# Patient Record
Sex: FEMALE | Race: WHITE | NOT HISPANIC OR LATINO
[De-identification: names, ages, dates, MRNs, and addresses within clinical notes are randomized per-mention and may not be internally consistent; named-entity substitution may affect disease eponyms.]

---

## 2018-05-02 ENCOUNTER — APPOINTMENT (OUTPATIENT)
Dept: OBGYN | Facility: CLINIC | Age: 83
End: 2018-05-02

## 2018-05-03 ENCOUNTER — APPOINTMENT (OUTPATIENT)
Dept: OBGYN | Facility: CLINIC | Age: 83
End: 2018-05-03
Payer: MEDICARE

## 2018-05-03 VITALS
WEIGHT: 170 LBS | SYSTOLIC BLOOD PRESSURE: 187 MMHG | HEIGHT: 62 IN | DIASTOLIC BLOOD PRESSURE: 79 MMHG | BODY MASS INDEX: 31.28 KG/M2

## 2018-05-03 DIAGNOSIS — M35.00 SICCA SYNDROME, UNSPECIFIED: ICD-10-CM

## 2018-05-03 DIAGNOSIS — Z80.3 FAMILY HISTORY OF MALIGNANT NEOPLASM OF BREAST: ICD-10-CM

## 2018-05-03 DIAGNOSIS — G62.9 POLYNEUROPATHY, UNSPECIFIED: ICD-10-CM

## 2018-05-03 DIAGNOSIS — Z86.018 PERSONAL HISTORY OF OTHER BENIGN NEOPLASM: ICD-10-CM

## 2018-05-03 DIAGNOSIS — E03.9 HYPOTHYROIDISM, UNSPECIFIED: ICD-10-CM

## 2018-05-03 DIAGNOSIS — Z87.891 PERSONAL HISTORY OF NICOTINE DEPENDENCE: ICD-10-CM

## 2018-05-03 DIAGNOSIS — Z80.8 FAMILY HISTORY OF MALIGNANT NEOPLASM OF OTHER ORGANS OR SYSTEMS: ICD-10-CM

## 2018-05-03 DIAGNOSIS — I10 ESSENTIAL (PRIMARY) HYPERTENSION: ICD-10-CM

## 2018-05-03 DIAGNOSIS — N90.89 OTHER SPECIFIED NONINFLAMMATORY DISORDERS OF VULVA AND PERINEUM: ICD-10-CM

## 2018-05-03 DIAGNOSIS — H20.9 UNSPECIFIED IRIDOCYCLITIS: ICD-10-CM

## 2018-05-03 DIAGNOSIS — O02.1 MISSED ABORTION: ICD-10-CM

## 2018-05-03 DIAGNOSIS — Z80.0 FAMILY HISTORY OF MALIGNANT NEOPLASM OF DIGESTIVE ORGANS: ICD-10-CM

## 2018-05-03 DIAGNOSIS — E11.9 TYPE 2 DIABETES MELLITUS W/OUT COMPLICATIONS: ICD-10-CM

## 2018-05-03 DIAGNOSIS — H20.829 VOGT-KOYANAGI SYNDROME, UNSPECIFIED EYE: ICD-10-CM

## 2018-05-03 DIAGNOSIS — Z98.891 HISTORY OF UTERINE SCAR FROM PREVIOUS SURGERY: ICD-10-CM

## 2018-05-03 DIAGNOSIS — G51.0 BELL'S PALSY: ICD-10-CM

## 2018-05-03 PROCEDURE — 99203 OFFICE O/P NEW LOW 30 MIN: CPT

## 2018-05-03 RX ORDER — AZITHROMYCIN 250 MG/1
250 TABLET, FILM COATED ORAL
Qty: 6 | Refills: 0 | Status: ACTIVE | COMMUNITY
Start: 2018-01-10

## 2018-05-03 RX ORDER — CYANOCOBALAMIN 500 UG/1
500 SPRAY NASAL
Qty: 4 | Refills: 0 | Status: ACTIVE | COMMUNITY
Start: 2018-04-05

## 2018-05-03 RX ORDER — PEN NEEDLE, DIABETIC 29 G X1/2"
31G X 8 MM NEEDLE, DISPOSABLE MISCELLANEOUS
Qty: 100 | Refills: 0 | Status: ACTIVE | COMMUNITY
Start: 2018-02-01

## 2018-05-03 RX ORDER — PREDNISOLONE ACETATE 10 MG/ML
1 SUSPENSION/ DROPS OPHTHALMIC
Qty: 5 | Refills: 0 | Status: ACTIVE | COMMUNITY
Start: 2018-04-06

## 2018-05-03 RX ORDER — CLOTRIMAZOLE AND BETAMETHASONE DIPROPIONATE 10; .5 MG/G; MG/G
1-0.05 CREAM TOPICAL TWICE DAILY
Qty: 30 | Refills: 0 | Status: ACTIVE | COMMUNITY
Start: 2018-05-03 | End: 1900-01-01

## 2018-05-03 RX ORDER — LEVOTHYROXINE SODIUM 0.03 MG/1
25 TABLET ORAL
Qty: 30 | Refills: 0 | Status: ACTIVE | COMMUNITY
Start: 2018-03-05

## 2018-05-03 RX ORDER — INSULIN GLARGINE 100 [IU]/ML
100 INJECTION, SOLUTION SUBCUTANEOUS
Qty: 15 | Refills: 0 | Status: ACTIVE | COMMUNITY
Start: 2018-04-04

## 2018-05-03 RX ORDER — CLINDAMYCIN HYDROCHLORIDE 150 MG/1
150 CAPSULE ORAL
Qty: 28 | Refills: 0 | Status: ACTIVE | COMMUNITY
Start: 2018-01-24

## 2018-05-03 RX ORDER — BLOOD SUGAR DIAGNOSTIC
STRIP MISCELLANEOUS
Qty: 200 | Refills: 0 | Status: ACTIVE | COMMUNITY
Start: 2017-07-31

## 2018-05-03 RX ORDER — LISINOPRIL 40 MG/1
40 TABLET ORAL
Qty: 90 | Refills: 0 | Status: ACTIVE | COMMUNITY
Start: 2018-01-13

## 2018-05-03 RX ORDER — ACYCLOVIR 400 MG/1
400 TABLET ORAL
Qty: 50 | Refills: 0 | Status: ACTIVE | COMMUNITY
Start: 2018-04-18

## 2018-05-03 RX ORDER — PREDNISONE 20 MG/1
20 TABLET ORAL
Qty: 8 | Refills: 0 | Status: ACTIVE | COMMUNITY
Start: 2018-04-15

## 2018-05-03 RX ORDER — TERCONAZOLE 80 MG/1
80 SUPPOSITORY VAGINAL
Qty: 3 | Refills: 0 | Status: ACTIVE | COMMUNITY
Start: 2018-05-03 | End: 1900-01-01

## 2018-05-03 RX ORDER — AMLODIPINE BESYLATE 2.5 MG/1
2.5 TABLET ORAL
Qty: 180 | Refills: 0 | Status: ACTIVE | COMMUNITY
Start: 2018-02-19

## 2018-05-03 RX ORDER — PREDNISONE 10 MG/1
10 TABLET ORAL
Qty: 10 | Refills: 0 | Status: ACTIVE | COMMUNITY
Start: 2018-04-18

## 2018-05-03 RX ORDER — LEVOTHYROXINE SODIUM 0.1 MG/1
100 TABLET ORAL
Qty: 30 | Refills: 0 | Status: ACTIVE | COMMUNITY
Start: 2018-04-06

## 2018-05-03 RX ORDER — METOPROLOL SUCCINATE 50 MG/1
50 TABLET, EXTENDED RELEASE ORAL
Qty: 180 | Refills: 0 | Status: ACTIVE | COMMUNITY
Start: 2018-02-10

## 2018-05-07 LAB
CANDIDA VAG CYTO: NOT DETECTED
G VAGINALIS+PREV SP MTYP VAG QL MICRO: NOT DETECTED
T VAGINALIS VAG QL WET PREP: NOT DETECTED

## 2018-05-08 ENCOUNTER — APPOINTMENT (OUTPATIENT)
Dept: OBGYN | Facility: CLINIC | Age: 83
End: 2018-05-08

## 2022-06-07 ENCOUNTER — APPOINTMENT (OUTPATIENT)
Dept: ORTHOPEDIC SURGERY | Facility: CLINIC | Age: 87
End: 2022-06-07
Payer: MEDICARE

## 2022-06-07 DIAGNOSIS — S83.281D OTHER TEAR OF LATERAL MENISCUS, CURRENT INJURY, RIGHT KNEE, SUBSEQUENT ENCOUNTER: ICD-10-CM

## 2022-06-07 PROCEDURE — 99213 OFFICE O/P EST LOW 20 MIN: CPT | Mod: 25

## 2022-06-07 PROCEDURE — 20610 DRAIN/INJ JOINT/BURSA W/O US: CPT

## 2022-06-07 NOTE — HISTORY OF PRESENT ILLNESS
[8] : 8 [Dull/Aching] : dull/aching [Localized] : localized [Nothing helps with pain getting better] : Nothing helps with pain getting better [Retired] : Work status: retired [de-identified] : Follow up today. Right knee pain continues. left knee several months ago had zilretta and it helpped a lot. here with her son today [] : Post Surgical Visit: no [FreeTextEntry1] : right knee [FreeTextEntry5] : pt is following up on her right knee  [de-identified] : motion  [de-identified] : cortisone

## 2022-06-07 NOTE — PHYSICAL EXAM
[Right] : right knee [Left] : left knee [NL (0)] : extension 0 degrees [5___] : hamstring 5[unfilled]/5 [] : no erythema [TWNoteComboBox7] : flexion 110 degrees

## 2022-06-07 NOTE — PROCEDURE
[FreeTextEntry3] : Large Joint Injection was performed in the right knee because of pain and inflammation. \par Zilretta: An injection of Zilretta 5 mg ,  \par Lidocaine: 4 cc\par \par Patient has tried OTC's including aspirin, Ibuprofen, Aleve etc or prescription NSAIDS, and/or exercises at home and/ or physical therapy without satisfactory response and Patient has decreased mobility in the joint. The risks, benefits, and alternatives to cortisone injection were explained in full to the patient. Risks outlined include but are not limited to infection, sepsis, bleeding, scarring, skin discoloration, temporary increase in pain, syncopal episode, failure to resolve symptoms, allergic reaction, symptom recurrence, and elevation of blood sugar in diabetics. Patient understood the risks. All questions were answered. After discussion of options, patient requested an injection. Oral informed consent was obtained. Sterile technique was utilized for the procedure including the preparation of the solutions used for the injection. Injection site was prepped with betadine and alcohol. Ethyl chloride sprayed topically. Sterile technique used. Patient tolerated procedure well. \par \par Post Procedure Instructions: Patient was advised to call if redness, pain, or fever occur. Apply ice for 15 min. every 2 hours for the next 12-24 hours as tolerated. Patient was advised to rest the joint(s) for 1-2 days.\par \par \par

## 2022-06-07 NOTE — DISCUSSION/SUMMARY
[de-identified] : The patient was advised of the diagnosis. The natural history of the pathology was explained in full to the patient in layman's terms. All questions were answered. The risks and benefits of surgical and non-surgical treatment alternatives were explained in full to the patient.\par \par The risks, benefits, and alternatives to corticosteroid injection were reviewed with the patient. Risks outlined include, but are not limited to infection, sepsis, bleeding, scarring, skin discoloration, temporary increase in pain, syncopal episode, failure to resolve symptoms, symptoms recurrence, allergic reaction, flare reaction, and elevation of blood sugar in diabetics. Patient understood the risks and asked to proceed with this treatment course.\par Knee would be helpped by total knee but age contraindication. cortisone when needed unlikely that visco would help. all explained

## 2022-08-23 ENCOUNTER — APPOINTMENT (OUTPATIENT)
Dept: ORTHOPEDIC SURGERY | Facility: CLINIC | Age: 87
End: 2022-08-23

## 2022-08-23 VITALS — HEIGHT: 62 IN | WEIGHT: 170 LBS | BODY MASS INDEX: 31.28 KG/M2

## 2022-08-23 PROCEDURE — 20610 DRAIN/INJ JOINT/BURSA W/O US: CPT

## 2022-08-23 PROCEDURE — 99213 OFFICE O/P EST LOW 20 MIN: CPT | Mod: 25

## 2022-08-23 NOTE — HISTORY OF PRESENT ILLNESS
[8] : 8 [Dull/Aching] : dull/aching [Localized] : localized [de-identified] : Follow up today. [FreeTextEntry1] : right knee

## 2022-08-23 NOTE — DISCUSSION/SUMMARY
[de-identified] : The patient and her son were advised of the diagnosis. The natural history of the pathology was explained in full to the patient in layman's terms. All questions were answered. The risks and benefits of surgical and non-surgical treatment alternatives were explained in full to the patient.\par \par The risks, benefits, and alternatives to viscosupplementation injection were reviewed with the patient. Risks outlined include but are not limited to infection, sepsis, bleeding, scarring, temporary increase in pain, syncopal episode, failure to resolve symptoms, symptoms recurrence, allergic reaction, flare reaction, pseudoseptic reaction. Patient understood the risks and asked to proceed with this treatment course.\par \par Total knee discussed and discussed risk and benefits. Gel one tried today and odds of it helping explained to patient and her son\par

## 2022-08-23 NOTE — REASON FOR VISIT
[Family Member] : family member [FreeTextEntry2] : Patient is here for a Follow Up appointment for the Right Knee.

## 2022-08-23 NOTE — PROCEDURE
[FreeTextEntry3] : Viscosupplementation Injection: X-ray evidence of osteoarthritis on this or prior visit and patient has tried OTC's including aspirin, Ibuprofen, Aleve etc or prescription NSAIDS, and/or exercises at home and/ or physical therapy without satisfactory response. \par \par An injection of Gel One #1 was injected into the right knee(s) after verbal consent obtained. \par \par Patient has tried OTC's including aspirin, Ibuprofen, Aleve etc or prescription NSAIDS, and/or exercises at home and/ or physical therapy without satisfactory response and Patient has decreased mobility in the joint. The risks, benefits, and alternatives to cortisone injection were explained in full to the patient. Risks outlined include but are not limited to infection, sepsis, bleeding, scarring, skin discoloration, temporary increase in pain, syncopal episode, failure to resolve symptoms, allergic reaction, and symptom recurrence. Patient understands the risks. All questions were answered. After discussion of options, patient requested an injection. Oral informed consent was obtained. Sterile technique was utilized for the procedure including the preparation of the solutions used for the injection. Injection site was prepped with betadine and alcohol. Ethyl chloride sprayed topically. Sterile technique used. Patient tolerated procedure well. \par \par Post Procedure Instructions: Patient was advised to call if redness, pain, or fever occur. Apply ice for 15 min. every 2 hours for the next 12-24 hours as tolerated. Patient was advised to rest the joint(s) for 1-2 days.\par

## 2022-11-08 ENCOUNTER — APPOINTMENT (OUTPATIENT)
Dept: ORTHOPEDIC SURGERY | Facility: CLINIC | Age: 87
End: 2022-11-08

## 2022-11-09 ENCOUNTER — APPOINTMENT (OUTPATIENT)
Dept: ORTHOPEDIC SURGERY | Facility: CLINIC | Age: 87
End: 2022-11-09

## 2022-11-09 VITALS — HEIGHT: 62 IN | WEIGHT: 170 LBS | BODY MASS INDEX: 31.28 KG/M2

## 2022-11-09 PROCEDURE — 99213 OFFICE O/P EST LOW 20 MIN: CPT

## 2022-11-09 NOTE — HISTORY OF PRESENT ILLNESS
[7] : 7 [de-identified] : Follow up today. She had good relief with Gel One injection at the last visit (8/3/22), but symptoms have started to return over the past 3 weeks. No new injury. Describes pain and swelling in both knees (R>L). She does get at home therapy and has been using rodolfo turner with some relief. She has had relief with Zilretta in the past (last was 6/7/22). [FreeTextEntry1] : right knee

## 2022-11-09 NOTE — DISCUSSION/SUMMARY
[de-identified] : The patient was advised of the diagnosis. The natural history of the pathology was explained in full to the patient in layman's terms. All questions were answered. The risks and benefits of surgical and non-surgical treatment alternatives were explained in full to the patient.\par \par Will ask cardiologist about topical diclofenac.\par \par No injection today due to likely UTI. Will plan for Zilretta injection next month pending resolution of UTI. She cannot have standard cortisone due to elevated BS.\par \par Rx for HKB.

## 2022-12-01 ENCOUNTER — APPOINTMENT (OUTPATIENT)
Dept: ORTHOPEDIC SURGERY | Facility: CLINIC | Age: 87
End: 2022-12-01

## 2022-12-01 VITALS — HEIGHT: 62 IN | WEIGHT: 170 LBS | BODY MASS INDEX: 31.28 KG/M2

## 2022-12-01 PROCEDURE — 99213 OFFICE O/P EST LOW 20 MIN: CPT | Mod: 25

## 2022-12-01 PROCEDURE — 20610 DRAIN/INJ JOINT/BURSA W/O US: CPT | Mod: RT

## 2022-12-01 NOTE — HISTORY OF PRESENT ILLNESS
[de-identified] : Follow up today. Symptoms continue and states right knee pain has been worse over the past few days. No new injury. Notes increased difficulty rising from a seated position. She is getting at home therapy. Did have relief with Zilretta in the past (last was 6/7/22).

## 2022-12-01 NOTE — DISCUSSION/SUMMARY
[de-identified] : The risks, benefits, and alternatives to corticosteroid injection were reviewed with the patient. Risks outlined include, but are not limited to infection, sepsis, bleeding, scarring, skin discoloration, temporary increase in pain, syncopal episode, failure to resolve symptoms, symptoms recurrence, allergic reaction, flare reaction, and elevation of blood sugar in diabetics. Patient understood the risks and asked to proceed with this treatment course.\par

## 2022-12-01 NOTE — PROCEDURE
[FreeTextEntry3] : Large Joint Injection was performed in the right knee because of pain and inflammation. \par Zilretta: An injection of Zilretta 5 mg ,  \par Lidocaine: 4 cc\par \par Patient has tried OTC's including aspirin, Ibuprofen, Aleve etc or prescription NSAIDS, and/or exercises at home and/ or physical therapy without satisfactory response and Patient has decreased mobility in the joint. The risks, benefits, and alternatives to cortisone injection were explained in full to the patient. Risks outlined include but are not limited to infection, sepsis, bleeding, scarring, skin discoloration, temporary increase in pain, syncopal episode, failure to resolve symptoms, allergic reaction, symptom recurrence, and elevation of blood sugar in diabetics. Patient understood the risks. All questions were answered. After discussion of options, patient requested an injection. Oral informed consent was obtained. Sterile technique was utilized for the procedure including the preparation of the solutions used for the injection. Injection site was prepped with betadine and alcohol. Ethyl chloride sprayed topically. Sterile technique used. Patient tolerated procedure well. \par \par Post Procedure Instructions: Patient was advised to call if redness, pain, or fever occur. Apply ice for 15 min. every 2 hours for the next 12-24 hours as tolerated. Patient was advised to rest the joint(s) for 1-2 days.

## 2023-05-30 ENCOUNTER — APPOINTMENT (OUTPATIENT)
Dept: ORTHOPEDIC SURGERY | Facility: CLINIC | Age: 88
End: 2023-05-30
Payer: MEDICARE

## 2023-05-30 VITALS — BODY MASS INDEX: 31.28 KG/M2 | WEIGHT: 170 LBS | HEIGHT: 62 IN

## 2023-05-30 VITALS — HEIGHT: 62 IN | BODY MASS INDEX: 31.28 KG/M2 | WEIGHT: 170 LBS

## 2023-05-30 PROCEDURE — 20610 DRAIN/INJ JOINT/BURSA W/O US: CPT | Mod: RT

## 2023-05-30 PROCEDURE — 99214 OFFICE O/P EST MOD 30 MIN: CPT | Mod: 25

## 2023-05-30 NOTE — IMAGING
[de-identified] : RIGHT KNEE EXAM\par AlignmentVarus \par Effusion: None\par Atrophy: None                                                 \par Stable to Varus/valgus stress\par Posterior Drawer Test: negative\par Anterior Drawer Test: Negative\par Knee Extension/Flexion: 0 / 120\par \par Medial/lateral compartments\par Medial joint line: No Tenderness\par Lateral joint line: No Tenderness\par Radha test: negative\par \par Patellofemoral joint\par Medial patellar facet: no tenderness\par Patellar grind: Negative\par \par Tendons:\par Pes Anserine: No tenderness\par Gerdy’s Tubercle/ IT Band: No tenderness\par Quadriceps Tendon: No Tenderness\par patellar tendon: no Tenderness\par Tibial tubercle: not tenderness\par Calf: no Tenderness\par \par Neurovascular exam\par Muscle strength: 5/5\par Sensation to light touch: intact\par Distal pulses: 2+\par \par IMAGING:\par 05/30/2023 Xrays of the Right Knee were taken demonstrating tricompartmental arthritis with joint space collapse, osteophytes, and sclerosis. primarily PF OA\par

## 2023-05-30 NOTE — PROCEDURE
[FreeTextEntry3] : The risks, benefits and alternatives to the injection were discussed with the patient. The decision was made to proceed with the injection to reduce inflammation within the area. Verbal consent was obtained for the procedure. The right knee was cleaned with alcohol and ethyl chloride was sprayed topically. 1cc of Zilretta was injected. The patient tolerated the procedure well and was instructed to ice the affected joint as needed later today. Activities can be performed as tolerated\par \par \par

## 2023-05-30 NOTE — ASSESSMENT
[FreeTextEntry1] : 86 y/o F with R knee OA\par \par Zilretta given\par Follow up in three months PRN\par

## 2023-05-30 NOTE — HISTORY OF PRESENT ILLNESS
[8] : 8 [Dull/Aching] : dull/aching [de-identified] : 05/30/2023 Ms. JOHN GEORGE is a 87 year female that comes in today with a chief complaint of Ji knee pain. Rt Knee pain worse than left. Has seen Dr. Brooks. Was undergoing zilretta injections. Last inj was in December. States injection is now wearing off. Gel injection in the past didn’t help out much [] : no [FreeTextEntry1] : b/l knee pain  [FreeTextEntry9] : CSI [de-identified] : Movement  [de-identified] : 12/1/22 [de-identified] : Dr. Brooks [de-identified] : CSI, gel injections

## 2023-09-05 ENCOUNTER — APPOINTMENT (OUTPATIENT)
Dept: ORTHOPEDIC SURGERY | Facility: CLINIC | Age: 88
End: 2023-09-05

## 2023-09-26 ENCOUNTER — APPOINTMENT (OUTPATIENT)
Dept: ORTHOPEDIC SURGERY | Facility: CLINIC | Age: 88
End: 2023-09-26
Payer: MEDICARE

## 2023-09-26 VITALS — BODY MASS INDEX: 31.28 KG/M2 | WEIGHT: 170 LBS | HEIGHT: 62 IN

## 2023-09-26 PROCEDURE — 73564 X-RAY EXAM KNEE 4 OR MORE: CPT | Mod: RT

## 2023-09-26 PROCEDURE — 20610 DRAIN/INJ JOINT/BURSA W/O US: CPT | Mod: RT

## 2023-09-26 PROCEDURE — 99213 OFFICE O/P EST LOW 20 MIN: CPT | Mod: 25

## 2023-12-15 ENCOUNTER — APPOINTMENT (OUTPATIENT)
Dept: ORTHOPEDIC SURGERY | Facility: CLINIC | Age: 88
End: 2023-12-15

## 2023-12-15 ENCOUNTER — APPOINTMENT (OUTPATIENT)
Dept: ORTHOPEDIC SURGERY | Facility: CLINIC | Age: 88
End: 2023-12-15
Payer: MEDICARE

## 2023-12-15 DIAGNOSIS — M17.12 UNILATERAL PRIMARY OSTEOARTHRITIS, LEFT KNEE: ICD-10-CM

## 2023-12-15 DIAGNOSIS — M17.11 UNILATERAL PRIMARY OSTEOARTHRITIS, RIGHT KNEE: ICD-10-CM

## 2023-12-15 PROCEDURE — 99213 OFFICE O/P EST LOW 20 MIN: CPT | Mod: 25

## 2023-12-15 PROCEDURE — 73564 X-RAY EXAM KNEE 4 OR MORE: CPT | Mod: LT

## 2023-12-15 NOTE — PROCEDURE
[FreeTextEntry3] : The risks, benefits and alternatives to the injection were discussed with the patient. The decision was made to proceed with the injection to reduce inflammation within the area. Verbal consent was obtained for the procedure. The left knee was cleaned with alcohol and ethyl chloride was sprayed topically. 1cc of Zilretta was injected. The patient tolerated the procedure well and was instructed to ice the affected joint as needed later today. Activities can be performed as tolerated

## 2023-12-15 NOTE — IMAGING
[de-identified] : LEFT KNEE EXAM Alignment  Varus  Effusion: None Atrophy: None                                                  Stable to Varus/valgus stress Posterior Drawer Test: negative Anterior Drawer Test: Negative Knee Extension/Flexion: 0 / 120  Medial/lateral compartments Medial joint line: POS Tenderness Lateral joint line: POS Tenderness Radha test: negative  Patellofemoral joint Medial patellar facet: no tenderness Patellar grind: Negative  Tendons: Pes Anserine: No tenderness Gerdy's Tubercle/ IT Band: No tenderness Quadriceps Tendon: No Tenderness patellar tendon: no Tenderness Tibial tubercle: not tenderness Calf: no Tenderness  Neurovascular exam Muscle strength: 5/5 Sensation to light touch: intact Distal pulses: 2+  IMAGIN/15/2023 Xrays of the Right Knee were taken demonstrating tricompartmental arthritis with joint space collapse, osteophytes, and sclerosis. primarily lateral compartment OA  RIGHT KNEE EXAM Alignment Varus Effusion: None Atrophy: None  Stable to Varus/valgus stress Posterior Drawer Test: negative Anterior Drawer Test: Negative Knee Extension/Flexion: 0 / 120  Medial/lateral compartments Medial joint line: No Tenderness Lateral joint line: No Tenderness Radha test: negative  Patellofemoral joint Medial patellar facet: no tenderness Patellar grind: Negative  Tendons: Pes Anserine: No tenderness Gerdy's Tubercle/ IT Band: No tenderness Quadriceps Tendon: No Tenderness patellar tendon: no Tenderness Tibial tubercle: not tenderness Calf: no Tenderness  Neurovascular exam Muscle strength: 5/5 Sensation to light touch: intact Distal pulses: 2+  [id number] Xrays of the Right Knee were taken demonstrating tricompartmental arthritis with joint space collapse, osteophytes, and sclerosis. primarily PF OA

## 2023-12-15 NOTE — HISTORY OF PRESENT ILLNESS
[8] : 8 [Dull/Aching] : dull/aching [de-identified] : 05/30/2023 Ms. JOHN GEORGE is a 87 year female that comes in today with a chief complaint of Ji knee pain. Rt Knee pain worse than left. Has seen Dr. Brooks. Was undergoing zilretta injections. Last inj was in December. States injection is now wearing off. Gel injection in the past didn't help out much  09/26/2023 follow up visit Ji knee, pt states she saw an improvement with the last injection. Pain returned about a month ago.   12/15/23- Patient reports increased R>L knee pain in the last month. Experienced relief with previous injection.  [] : no [FreeTextEntry1] : b/l knee pain  [FreeTextEntry9] : CSI [de-identified] : Movement  [de-identified] : 12/1/22 [de-identified] : Dr. Brooks [de-identified] : CSI, gel injections

## 2024-01-30 ENCOUNTER — APPOINTMENT (OUTPATIENT)
Dept: ORTHOPEDIC SURGERY | Facility: CLINIC | Age: 89
End: 2024-01-30

## 2024-02-09 RX ORDER — TENAPANOR HYDROCHLORIDE 53.2 MG/1
50 TABLET ORAL
Qty: 60 | Refills: 3 | Status: ACTIVE | COMMUNITY
Start: 2024-02-09 | End: 1900-01-01

## 2024-02-29 ENCOUNTER — APPOINTMENT (OUTPATIENT)
Dept: GASTROENTEROLOGY | Facility: CLINIC | Age: 89
End: 2024-02-29
Payer: MEDICARE

## 2024-02-29 DIAGNOSIS — K57.90 DIVERTICULOSIS OF INTESTINE, PART UNSPECIFIED, W/OUT PERFORATION OR ABSCESS W/OUT BLEEDING: ICD-10-CM

## 2024-02-29 DIAGNOSIS — K58.9 IRRITABLE BOWEL SYNDROME W/OUT DIARRHEA: ICD-10-CM

## 2024-02-29 DIAGNOSIS — R19.4 CHANGE IN BOWEL HABIT: ICD-10-CM

## 2024-02-29 PROCEDURE — 99214 OFFICE O/P EST MOD 30 MIN: CPT

## 2024-02-29 NOTE — ASSESSMENT
[FreeTextEntry1] : Patient with ibs with constipation and idiopathic constipation. High fiber diet discussed with patient and her son.  Patient to increase her lactulose therapy to three times daily, and will continue linzess

## 2024-02-29 NOTE — HISTORY OF PRESENT ILLNESS
[FreeTextEntry1] : Patient with followup of ibs with constipation and idiopathic constipation Advised patient to increase lactulose to three times daily and to continue linzess

## 2024-02-29 NOTE — REASON FOR VISIT
[Home] : at home, [unfilled] , at the time of the visit. [Patient] : the patient [Follow-up] : a follow-up of an existing diagnosis [Family Member] : family member

## 2024-02-29 NOTE — PHYSICAL EXAM
[Normal Voice/Communication] : normal voice/communication [Healthy Appearing] : healthy appearing [No Acute Distress] : no acute distress [Sclera] : the sclera and conjunctiva were normal [Hearing Threshold Finger Rub Not [place]] : hearing was normal [Normal Lips/Gums] : the lips and gums were normal [Oropharynx] : the oropharynx was normal [Normal Appearance] : the appearance of the neck was normal [No Neck Mass] : no neck mass was observed [No Respiratory Distress] : no respiratory distress [No Acc Muscle Use] : no accessory muscle use [Respiration, Rhythm And Depth] : normal respiratory rhythm and effort [Auscultation Breath Sounds / Voice Sounds] : lungs were clear to auscultation bilaterally [Heart Rate And Rhythm] : heart rate was normal and rhythm regular [Normal S1, S2] : normal S1 and S2 [Murmurs] : no murmurs [Bowel Sounds] : normal bowel sounds [Abdomen Tenderness] : non-tender [No Masses] : no abdominal mass palpated [Abdomen Soft] : soft [] : no hepatosplenomegaly [Oriented To Time, Place, And Person] : oriented to person, place, and time

## 2024-02-29 NOTE — PHYSICAL EXAM
[Normal Voice/Communication] : normal voice/communication [Healthy Appearing] : healthy appearing [No Acute Distress] : no acute distress [Sclera] : the sclera and conjunctiva were normal [Hearing Threshold Finger Rub Not [place]] : hearing was normal [Normal Lips/Gums] : the lips and gums were normal [Oropharynx] : the oropharynx was normal [Normal Appearance] : the appearance of the neck was normal [No Neck Mass] : no neck mass was observed [No Respiratory Distress] : no respiratory distress [No Acc Muscle Use] : no accessory muscle use [Respiration, Rhythm And Depth] : normal respiratory rhythm and effort [Auscultation Breath Sounds / Voice Sounds] : lungs were clear to auscultation bilaterally [Heart Rate And Rhythm] : heart rate was normal and rhythm regular [Normal S1, S2] : normal S1 and S2 [Murmurs] : no murmurs [Bowel Sounds] : normal bowel sounds [Abdomen Tenderness] : non-tender [No Masses] : no abdominal mass palpated [Abdomen Soft] : soft [] : no hepatosplenomegaly [Oriented To Time, Place, And Person] : oriented to person, place, and time

## 2024-03-14 ENCOUNTER — APPOINTMENT (OUTPATIENT)
Dept: GASTROENTEROLOGY | Facility: CLINIC | Age: 89
End: 2024-03-14
Payer: COMMERCIAL

## 2024-03-14 VITALS
WEIGHT: 160 LBS | HEIGHT: 62 IN | BODY MASS INDEX: 29.44 KG/M2 | DIASTOLIC BLOOD PRESSURE: 66 MMHG | OXYGEN SATURATION: 99 % | SYSTOLIC BLOOD PRESSURE: 126 MMHG | HEART RATE: 78 BPM

## 2024-03-14 DIAGNOSIS — K64.9 UNSPECIFIED HEMORRHOIDS: ICD-10-CM

## 2024-03-14 DIAGNOSIS — K21.9 GASTRO-ESOPHAGEAL REFLUX DISEASE W/OUT ESOPHAGITIS: ICD-10-CM

## 2024-03-14 PROCEDURE — 99214 OFFICE O/P EST MOD 30 MIN: CPT

## 2024-03-14 RX ORDER — HYDROCORTISONE 25 MG/G
2.5 CREAM TOPICAL DAILY
Qty: 30 | Refills: 2 | Status: ACTIVE | COMMUNITY
Start: 2024-03-14 | End: 1900-01-01

## 2024-03-14 RX ORDER — AZITHROMYCIN 250 MG/1
250 TABLET, FILM COATED ORAL
Qty: 6 | Refills: 3 | Status: ACTIVE | COMMUNITY
Start: 2024-03-14 | End: 1900-01-01

## 2024-03-14 RX ORDER — OMEPRAZOLE 20 MG/1
20 CAPSULE, DELAYED RELEASE ORAL
Qty: 30 | Refills: 3 | Status: ACTIVE | COMMUNITY
Start: 2024-03-14 | End: 1900-01-01

## 2024-03-14 NOTE — PHYSICAL EXAM
[Alert] : alert [Healthy Appearing] : healthy appearing [Normal Voice/Communication] : normal voice/communication [No Acute Distress] : no acute distress [Hearing Threshold Finger Rub Not [place]] : hearing was normal [Sclera] : the sclera and conjunctiva were normal [Normal Lips/Gums] : the lips and gums were normal [Oropharynx] : the oropharynx was normal [Normal Appearance] : the appearance of the neck was normal [No Respiratory Distress] : no respiratory distress [No Neck Mass] : no neck mass was observed [No Acc Muscle Use] : no accessory muscle use [Respiration, Rhythm And Depth] : normal respiratory rhythm and effort [Normal S1, S2] : normal S1 and S2 [Auscultation Breath Sounds / Voice Sounds] : lungs were clear to auscultation bilaterally [Heart Rate And Rhythm] : heart rate was normal and rhythm regular [Murmurs] : no murmurs [Bowel Sounds] : normal bowel sounds [Abdomen Tenderness] : non-tender [No Masses] : no abdominal mass palpated [] : no hepatosplenomegaly [Abdomen Soft] : soft [Oriented To Time, Place, And Person] : oriented to person, place, and time

## 2024-03-14 NOTE — ASSESSMENT
[FreeTextEntry1] : Patient with acute colonic infection  low residue diet  azithromycin as directed  proton pump inhibitor therapy will do a telehealth or telephone call in several weeks

## 2024-03-19 DIAGNOSIS — K57.32 DIVERTICULITIS OF LARGE INTESTINE W/OUT PERFORATION OR ABSCESS W/OUT BLEEDING: ICD-10-CM

## 2024-03-19 RX ORDER — DOXYCYCLINE HYCLATE 100 MG/1
100 CAPSULE ORAL
Qty: 14 | Refills: 1 | Status: ACTIVE | COMMUNITY
Start: 2024-03-19 | End: 1900-01-01

## 2024-04-01 ENCOUNTER — APPOINTMENT (OUTPATIENT)
Dept: GASTROENTEROLOGY | Facility: CLINIC | Age: 89
End: 2024-04-01

## 2024-04-15 ENCOUNTER — RX RENEWAL (OUTPATIENT)
Age: 89
End: 2024-04-15

## 2024-04-15 RX ORDER — LINACLOTIDE 72 UG/1
72 CAPSULE, GELATIN COATED ORAL
Qty: 30 | Refills: 0 | Status: ACTIVE | COMMUNITY
Start: 2024-02-15 | End: 1900-01-01

## 2024-09-17 ENCOUNTER — APPOINTMENT (OUTPATIENT)
Dept: GASTROENTEROLOGY | Facility: CLINIC | Age: 89
End: 2024-09-17
Payer: MEDICARE

## 2024-09-17 VITALS
DIASTOLIC BLOOD PRESSURE: 59 MMHG | WEIGHT: 160 LBS | OXYGEN SATURATION: 98 % | HEIGHT: 62 IN | SYSTOLIC BLOOD PRESSURE: 135 MMHG | BODY MASS INDEX: 29.44 KG/M2 | HEART RATE: 53 BPM

## 2024-09-17 DIAGNOSIS — K57.32 DIVERTICULITIS OF LARGE INTESTINE W/OUT PERFORATION OR ABSCESS W/OUT BLEEDING: ICD-10-CM

## 2024-09-17 DIAGNOSIS — K58.9 IRRITABLE BOWEL SYNDROME W/OUT DIARRHEA: ICD-10-CM

## 2024-09-17 DIAGNOSIS — R19.4 CHANGE IN BOWEL HABIT: ICD-10-CM

## 2024-09-17 PROCEDURE — 99214 OFFICE O/P EST MOD 30 MIN: CPT

## 2024-09-17 RX ORDER — DOXYCYCLINE HYCLATE 100 MG/1
100 CAPSULE ORAL
Qty: 14 | Refills: 1 | Status: ACTIVE | COMMUNITY
Start: 2024-09-17 | End: 1900-01-01

## 2024-09-17 NOTE — HISTORY OF PRESENT ILLNESS
[FreeTextEntry1] : Chief complaint: abdominal pain   HPI: Patient presents for evaluation and management of abdominal pain. Intermittent crampy left lower quadrant abdominal pain. There are no exacerbating or ameliorating factors. The abdominal pain is non radiating; associated factors include nausea  Patient also presents with flare of peptic esophagitis with dyspepsia, indigestion acid reflux and epigastric pain   New prescription sent to pharmacy for doxycycline   Moderate degree of complexity of medical decision making involved in this patient encounter

## 2024-09-17 NOTE — PHYSICAL EXAM

## 2024-09-17 NOTE — ASSESSMENT
[FreeTextEntry1] : Patient presents for evaluation and management of complex syndrome of abdominal pain. Intermittent crampy left lower quadrant abdominal pain. There are no exacerbating or ameliorating factors. The abdominal pain is non radiating. Associated factors include nausea.  Patient also with reflux esophagitis flareup with heartburn, indigestion, acid reflux   New prescription sent to pharmacy for doxycycline   Independent historian was the patient's son who supplemented the history given by the patient because the patient has mild cognitive difficultires

## 2024-09-18 ENCOUNTER — RESULT REVIEW (OUTPATIENT)
Age: 89
End: 2024-09-18

## 2024-09-19 ENCOUNTER — APPOINTMENT (OUTPATIENT)
Dept: CT IMAGING | Facility: CLINIC | Age: 89
End: 2024-09-19
Payer: MEDICARE

## 2024-09-19 PROCEDURE — 74176 CT ABD & PELVIS W/O CONTRAST: CPT

## 2024-09-20 RX ORDER — AZITHROMYCIN 250 MG/1
250 TABLET, FILM COATED ORAL
Qty: 6 | Refills: 1 | Status: ACTIVE | COMMUNITY
Start: 2024-09-20 | End: 1900-01-01

## 2024-09-25 ENCOUNTER — NON-APPOINTMENT (OUTPATIENT)
Age: 89
End: 2024-09-25

## 2024-09-30 ENCOUNTER — APPOINTMENT (OUTPATIENT)
Dept: GASTROENTEROLOGY | Facility: CLINIC | Age: 89
End: 2024-09-30
Payer: MEDICARE

## 2024-09-30 VITALS
DIASTOLIC BLOOD PRESSURE: 70 MMHG | OXYGEN SATURATION: 97 % | WEIGHT: 150 LBS | BODY MASS INDEX: 27.6 KG/M2 | SYSTOLIC BLOOD PRESSURE: 116 MMHG | HEART RATE: 50 BPM | HEIGHT: 62 IN

## 2024-09-30 DIAGNOSIS — K21.9 GASTRO-ESOPHAGEAL REFLUX DISEASE W/OUT ESOPHAGITIS: ICD-10-CM

## 2024-09-30 DIAGNOSIS — K58.9 IRRITABLE BOWEL SYNDROME, UNSPECIFIED: ICD-10-CM

## 2024-09-30 DIAGNOSIS — R19.4 CHANGE IN BOWEL HABIT: ICD-10-CM

## 2024-09-30 DIAGNOSIS — K57.32 DIVERTICULITIS OF LARGE INTESTINE W/OUT PERFORATION OR ABSCESS W/OUT BLEEDING: ICD-10-CM

## 2024-09-30 PROCEDURE — 99214 OFFICE O/P EST MOD 30 MIN: CPT

## 2024-09-30 RX ORDER — AZITHROMYCIN 250 MG/1
250 TABLET, FILM COATED ORAL
Qty: 6 | Refills: 2 | Status: ACTIVE | COMMUNITY
Start: 2024-09-30 | End: 1900-01-01

## 2024-10-01 NOTE — ASSESSMENT
[FreeTextEntry1] : Patient presents for followup of multipe comple gastrointestinal signs and symptoms   Has intermittent crampy llq abdominal pain   cat scan report was reviewed with patient and family   independent historian was patient;s daughter, as patient has had some cognitive issues   New prescription sent to pharmacy for azithromycin   moderate degree of complexity of medical decision making involved in patient encounter

## 2024-10-01 NOTE — PHYSICAL EXAM

## 2024-10-01 NOTE — HISTORY OF PRESENT ILLNESS
[FreeTextEntry1] : Chief complaint:  followup of colonic infection   HPI: Patient presents for evaluation and followup of multiple complex gastrointestinal signs and symptoms. Intermittent crapy llq abdoinal pain. There are o exacerbating or ameliorating factors. The abdominal pain is non radiating; associated factors include nausea.   No sign of acute gastrointestinal bleeding; no hameatemesis, melena or hematochezia. No dysphagia or odynophagia.   No sign of acute gastrointestinal bleeding   Low residue diet reviewed with patient and family  Independed history obtained from patient's duaghter   cat scan of abdomen and pelvis report was reviewed with patient and family  moderate degree of complexity of medical decision making involved in patient encounter

## 2024-11-25 ENCOUNTER — NON-APPOINTMENT (OUTPATIENT)
Age: 89
End: 2024-11-25

## 2024-12-01 ENCOUNTER — NON-APPOINTMENT (OUTPATIENT)
Age: 89
End: 2024-12-01

## 2025-02-22 ENCOUNTER — NON-APPOINTMENT (OUTPATIENT)
Age: 89
End: 2025-02-22

## 2025-07-15 ENCOUNTER — RX RENEWAL (OUTPATIENT)
Age: 89
End: 2025-07-15

## 2025-09-12 ENCOUNTER — NON-APPOINTMENT (OUTPATIENT)
Age: 89
End: 2025-09-12